# Patient Record
Sex: MALE | Race: WHITE | Employment: OTHER | URBAN - METROPOLITAN AREA
[De-identification: names, ages, dates, MRNs, and addresses within clinical notes are randomized per-mention and may not be internally consistent; named-entity substitution may affect disease eponyms.]

---

## 2017-10-05 RX ORDER — IBUPROFEN 400 MG/1
400 TABLET ORAL EVERY 6 HOURS PRN
COMMUNITY

## 2017-10-07 ENCOUNTER — HOSPITAL ENCOUNTER (OUTPATIENT)
Facility: HOSPITAL | Age: 57
Setting detail: HOSPITAL OUTPATIENT SURGERY
Discharge: HOME | End: 2017-10-07
Attending: ORTHOPAEDIC SURGERY | Admitting: ORTHOPAEDIC SURGERY
Payer: COMMERCIAL

## 2017-10-07 ENCOUNTER — SURGERY (OUTPATIENT)
Age: 57
End: 2017-10-07

## 2017-10-07 ENCOUNTER — ANESTHESIA EVENT (OUTPATIENT)
Dept: SURGERY | Facility: HOSPITAL | Age: 57
End: 2017-10-07
Payer: COMMERCIAL

## 2017-10-07 ENCOUNTER — ANESTHESIA (OUTPATIENT)
Dept: SURGERY | Facility: HOSPITAL | Age: 57
End: 2017-10-07
Payer: COMMERCIAL

## 2017-10-07 VITALS
SYSTOLIC BLOOD PRESSURE: 110 MMHG | HEART RATE: 61 BPM | RESPIRATION RATE: 16 BRPM | TEMPERATURE: 98 F | DIASTOLIC BLOOD PRESSURE: 78 MMHG | OXYGEN SATURATION: 98 % | WEIGHT: 206 LBS | BODY MASS INDEX: 31.22 KG/M2 | HEIGHT: 68 IN

## 2017-10-07 DIAGNOSIS — S52.509A DISTAL RADIAL FRACTURE: Primary | ICD-10-CM

## 2017-10-07 PROCEDURE — 64417 NJX AA&/STRD AX NERVE IMG: CPT | Performed by: ORTHOPAEDIC SURGERY

## 2017-10-07 PROCEDURE — 0PSJ04Z REPOSITION LEFT RADIUS WITH INTERNAL FIXATION DEVICE, OPEN APPROACH: ICD-10-PCS | Performed by: ORTHOPAEDIC SURGERY

## 2017-10-07 PROCEDURE — 76942 ECHO GUIDE FOR BIOPSY: CPT | Performed by: ORTHOPAEDIC SURGERY

## 2017-10-07 PROCEDURE — 01N50ZZ RELEASE MEDIAN NERVE, OPEN APPROACH: ICD-10-PCS | Performed by: ORTHOPAEDIC SURGERY

## 2017-10-07 PROCEDURE — 99152 MOD SED SAME PHYS/QHP 5/>YRS: CPT | Performed by: ORTHOPAEDIC SURGERY

## 2017-10-07 PROCEDURE — 3E0T3BZ INTRODUCTION OF ANESTHETIC AGENT INTO PERIPHERAL NERVES AND PLEXI, PERCUTANEOUS APPROACH: ICD-10-PCS | Performed by: ANESTHESIOLOGY

## 2017-10-07 DEVICE — SCREW BN 2.4MM 13MM LCP SS: Type: IMPLANTABLE DEVICE | Site: WRIST | Status: FUNCTIONAL

## 2017-10-07 DEVICE — SCREW BN 2.4MM 16MM LCP SS: Type: IMPLANTABLE DEVICE | Site: WRIST | Status: FUNCTIONAL

## 2017-10-07 DEVICE — PLATE NAR 51MM SS 2.4MM SCR 6: Type: IMPLANTABLE DEVICE | Site: WRIST | Status: FUNCTIONAL

## 2017-10-07 DEVICE — WIRE K SYNT 1.6 292.16: Type: IMPLANTABLE DEVICE | Site: WRIST | Status: FUNCTIONAL

## 2017-10-07 DEVICE — SCREW BN 2.4MM 20MM LCP SS: Type: IMPLANTABLE DEVICE | Site: WRIST | Status: FUNCTIONAL

## 2017-10-07 DEVICE — SCREW BN 2.4MM 4MM 14MM LCP SS: Type: IMPLANTABLE DEVICE | Site: WRIST | Status: FUNCTIONAL

## 2017-10-07 RX ORDER — ACETAMINOPHEN 500 MG
1000 TABLET ORAL EVERY 6 HOURS PRN
COMMUNITY

## 2017-10-07 RX ORDER — NALOXONE HYDROCHLORIDE 0.4 MG/ML
80 INJECTION, SOLUTION INTRAMUSCULAR; INTRAVENOUS; SUBCUTANEOUS AS NEEDED
Status: DISCONTINUED | OUTPATIENT
Start: 2017-10-07 | End: 2017-10-07

## 2017-10-07 RX ORDER — MORPHINE SULFATE 10 MG/ML
6 INJECTION, SOLUTION INTRAMUSCULAR; INTRAVENOUS EVERY 10 MIN PRN
Status: DISCONTINUED | OUTPATIENT
Start: 2017-10-07 | End: 2017-10-07

## 2017-10-07 RX ORDER — HYDROCODONE BITARTRATE AND ACETAMINOPHEN 5; 325 MG/1; MG/1
2 TABLET ORAL AS NEEDED
Status: DISCONTINUED | OUTPATIENT
Start: 2017-10-07 | End: 2017-10-07

## 2017-10-07 RX ORDER — HYDROCODONE BITARTRATE AND ACETAMINOPHEN 5; 325 MG/1; MG/1
1 TABLET ORAL AS NEEDED
Status: DISCONTINUED | OUTPATIENT
Start: 2017-10-07 | End: 2017-10-07

## 2017-10-07 RX ORDER — SODIUM CHLORIDE, SODIUM LACTATE, POTASSIUM CHLORIDE, CALCIUM CHLORIDE 600; 310; 30; 20 MG/100ML; MG/100ML; MG/100ML; MG/100ML
INJECTION, SOLUTION INTRAVENOUS CONTINUOUS
Status: DISCONTINUED | OUTPATIENT
Start: 2017-10-07 | End: 2017-10-07

## 2017-10-07 RX ORDER — HYDROMORPHONE HYDROCHLORIDE 1 MG/ML
0.2 INJECTION, SOLUTION INTRAMUSCULAR; INTRAVENOUS; SUBCUTANEOUS EVERY 5 MIN PRN
Status: DISCONTINUED | OUTPATIENT
Start: 2017-10-07 | End: 2017-10-07

## 2017-10-07 RX ORDER — BACITRACIN 50000 [USP'U]/1
INJECTION, POWDER, LYOPHILIZED, FOR SOLUTION INTRAMUSCULAR AS NEEDED
Status: DISCONTINUED | OUTPATIENT
Start: 2017-10-07 | End: 2017-10-07 | Stop reason: HOSPADM

## 2017-10-07 RX ORDER — HYDROMORPHONE HYDROCHLORIDE 1 MG/ML
0.4 INJECTION, SOLUTION INTRAMUSCULAR; INTRAVENOUS; SUBCUTANEOUS EVERY 5 MIN PRN
Status: DISCONTINUED | OUTPATIENT
Start: 2017-10-07 | End: 2017-10-07

## 2017-10-07 RX ORDER — MORPHINE SULFATE 2 MG/ML
2 INJECTION, SOLUTION INTRAMUSCULAR; INTRAVENOUS EVERY 10 MIN PRN
Status: DISCONTINUED | OUTPATIENT
Start: 2017-10-07 | End: 2017-10-07

## 2017-10-07 RX ORDER — ONDANSETRON 2 MG/ML
4 INJECTION INTRAMUSCULAR; INTRAVENOUS ONCE AS NEEDED
Status: DISCONTINUED | OUTPATIENT
Start: 2017-10-07 | End: 2017-10-07

## 2017-10-07 RX ORDER — FAMOTIDINE 20 MG/1
20 TABLET ORAL ONCE
Status: COMPLETED | OUTPATIENT
Start: 2017-10-07 | End: 2017-10-07

## 2017-10-07 RX ORDER — DEXAMETHASONE SODIUM PHOSPHATE 4 MG/ML
VIAL (ML) INJECTION AS NEEDED
Status: DISCONTINUED | OUTPATIENT
Start: 2017-10-07 | End: 2017-10-07 | Stop reason: SURG

## 2017-10-07 RX ORDER — ONDANSETRON 2 MG/ML
INJECTION INTRAMUSCULAR; INTRAVENOUS AS NEEDED
Status: DISCONTINUED | OUTPATIENT
Start: 2017-10-07 | End: 2017-10-07 | Stop reason: SURG

## 2017-10-07 RX ORDER — MORPHINE SULFATE 4 MG/ML
4 INJECTION, SOLUTION INTRAMUSCULAR; INTRAVENOUS EVERY 10 MIN PRN
Status: DISCONTINUED | OUTPATIENT
Start: 2017-10-07 | End: 2017-10-07

## 2017-10-07 RX ORDER — HYDROMORPHONE HYDROCHLORIDE 1 MG/ML
0.6 INJECTION, SOLUTION INTRAMUSCULAR; INTRAVENOUS; SUBCUTANEOUS EVERY 5 MIN PRN
Status: DISCONTINUED | OUTPATIENT
Start: 2017-10-07 | End: 2017-10-07

## 2017-10-07 RX ORDER — ROPIVACAINE HYDROCHLORIDE 5 MG/ML
INJECTION, SOLUTION EPIDURAL; INFILTRATION; PERINEURAL AS NEEDED
Status: DISCONTINUED | OUTPATIENT
Start: 2017-10-07 | End: 2017-10-07 | Stop reason: SURG

## 2017-10-07 RX ORDER — ACETAMINOPHEN 325 MG/1
650 TABLET ORAL ONCE
Status: COMPLETED | OUTPATIENT
Start: 2017-10-07 | End: 2017-10-07

## 2017-10-07 RX ORDER — METOCLOPRAMIDE 10 MG/1
10 TABLET ORAL ONCE
Status: COMPLETED | OUTPATIENT
Start: 2017-10-07 | End: 2017-10-07

## 2017-10-07 RX ORDER — LIDOCAINE HYDROCHLORIDE 10 MG/ML
INJECTION, SOLUTION EPIDURAL; INFILTRATION; INTRACAUDAL; PERINEURAL AS NEEDED
Status: DISCONTINUED | OUTPATIENT
Start: 2017-10-07 | End: 2017-10-07 | Stop reason: SURG

## 2017-10-07 RX ORDER — HALOPERIDOL 5 MG/ML
0.25 INJECTION INTRAMUSCULAR ONCE AS NEEDED
Status: DISCONTINUED | OUTPATIENT
Start: 2017-10-07 | End: 2017-10-07

## 2017-10-07 RX ORDER — MIDAZOLAM HYDROCHLORIDE 1 MG/ML
INJECTION INTRAMUSCULAR; INTRAVENOUS AS NEEDED
Status: DISCONTINUED | OUTPATIENT
Start: 2017-10-07 | End: 2017-10-07 | Stop reason: SURG

## 2017-10-07 RX ADMIN — DEXAMETHASONE SODIUM PHOSPHATE 4 MG: 4 MG/ML VIAL (ML) INJECTION at 08:07:00

## 2017-10-07 RX ADMIN — ROPIVACAINE HYDROCHLORIDE 30 ML: 5 INJECTION, SOLUTION EPIDURAL; INFILTRATION; PERINEURAL at 07:56:00

## 2017-10-07 RX ADMIN — SODIUM CHLORIDE, SODIUM LACTATE, POTASSIUM CHLORIDE, CALCIUM CHLORIDE: 600; 310; 30; 20 INJECTION, SOLUTION INTRAVENOUS at 08:05:00

## 2017-10-07 RX ADMIN — SODIUM CHLORIDE, SODIUM LACTATE, POTASSIUM CHLORIDE, CALCIUM CHLORIDE: 600; 310; 30; 20 INJECTION, SOLUTION INTRAVENOUS at 09:55:00

## 2017-10-07 RX ADMIN — MIDAZOLAM HYDROCHLORIDE 2 MG: 1 INJECTION INTRAMUSCULAR; INTRAVENOUS at 07:51:00

## 2017-10-07 RX ADMIN — ONDANSETRON 4 MG: 2 INJECTION INTRAMUSCULAR; INTRAVENOUS at 08:07:00

## 2017-10-07 RX ADMIN — LIDOCAINE HYDROCHLORIDE 50 MG: 10 INJECTION, SOLUTION EPIDURAL; INFILTRATION; INTRACAUDAL; PERINEURAL at 08:07:00

## 2017-10-07 RX ADMIN — LIDOCAINE HYDROCHLORIDE 2 ML: 10 INJECTION, SOLUTION EPIDURAL; INFILTRATION; INTRACAUDAL; PERINEURAL at 07:55:00

## 2017-10-07 NOTE — ANESTHESIA PROCEDURE NOTES
Peripheral Block    Anesthesiologist:  Karson Can  Performed by:   Anesthesiologist  Patient Location:  PACU  Start Time:  10/7/2017 7:51 AM  End Time:  10/7/2017 7:56 AM  Site Identification: ultrasound guided, real time ultrasound guided, nerve stimula

## 2017-10-07 NOTE — ANESTHESIA PREPROCEDURE EVALUATION
Anesthesia PreOp Note    HPI:     Sammy Chaudhary is a 56year old male who presents for preoperative consultation requested by: Yadira Fang MD    Date of Surgery: 10/7/2017    Procedure(s):  WRIST OPEN REDUCTION INTERNAL FIXATION  Indication: left wris saturation is 96%.     10/05/17  1802 10/07/17  0500   BP:  126/80   BP Location:  Right arm   Pulse:  67   Resp:  18   Temp:  97.8 °F (36.6 °C)   TempSrc:  Oral   SpO2:  96%   Weight: 90.7 kg (200 lb) 93.4 kg (206 lb)   Height: 1.727 m (5' 8\")         Ane

## 2017-10-07 NOTE — BRIEF OP NOTE
Pre-Operative Diagnosis: left wrist fracture     Post-Operative Diagnosis: left wrist fracture [id number] distal radial fracture with severe swelling and abrasions to palm      Procedure Performed:   Procedure(s):  left wrist open reduction internal

## 2017-10-07 NOTE — ANESTHESIA POSTPROCEDURE EVALUATION
Patient: Marco Antonio Wilks    Procedure Summary     Date:  10/07/17 Room / Location:  [facility] OR 05 / [facility] OR    Anesthesia Start:  0805 Anesthesia Stop:      Procedure:  WRIST OPEN REDUCTION INTERNAL FIXATION (Left Wrist) Diagnosis:  (left wrist fractu

## 2017-10-10 NOTE — OPERATIVE REPORT
[facility]    PATIENT'S NAME: Masood Bowen   ATTENDING PHYSICIAN: Greg Parada MD   OPERATING PHYSICIAN: Greg Parada MD   PATIENT ACCOUNT#:   [id number]    LOCATION:  [address]  MEDICAL RECORD #:   [id number]       DATE OF placed was removed. Abrasions were cleaned and debrided. Axillary high tourniquet was placed. He received IV antibiotics preoperatively. The right upper extremity was examined under fluoroscopic guidance throughout the operative procedure.   The patient fluoroscopic motion imaging studies. With this having been accomplished, a volar plate was placed. The metaphyseal segment reapplied.   The distal radial ulnar joint had been reduced with large volar articular fragments having been reduced and impacted in oblique including a lateral joint line view with slight distraction placed on the radial carpal joint. The 1.8 mm pin was cut short.   A longitudinal incision was then placed distally extending down through subcutaneous tissue under loupe magnification in excellent condition. There were no complications. Discussion was undertaken with his wife and family. He was placed in Ace. A sterile compression dressing with a sugar tong plaster splint. A Polar Care was utilized, as well as a Augie Marinelli

## (undated) DEVICE — SUTURE MONOCRYL 3-0 Y497G

## (undated) DEVICE — DRESSING SILVERLON 2X12

## (undated) DEVICE — TOWEL OR BLU 16X26 STRL

## (undated) DEVICE — DERMABOND LIQUID ADHESIVE

## (undated) DEVICE — UPPER EXTREMITY: Brand: MEDLINE INDUSTRIES, INC.

## (undated) DEVICE — 3M™ COBAN™ NL STERILE NON-LATEX SELF-ADHERENT WRAP, 2084S, 4 IN X 5 YD (10 CM X 4,5 M), 18 ROLLS/CASE: Brand: 3M™ COBAN™

## (undated) DEVICE — DRAPE MN CARM

## (undated) DEVICE — VIOLET BRAIDED (POLYGLACTIN 910), SYNTHETIC ABSORBABLE SUTURE: Brand: COATED VICRYL

## (undated) DEVICE — REM POLYHESIVE ADULT PATIENT RETURN ELECTRODE: Brand: VALLEYLAB

## (undated) DEVICE — OCCLUSIVE GAUZE STRIP OVERWRAP,3% BISMUTH TRIBROMOPHENATE IN PETROLATUM BLEND: Brand: XEROFORM

## (undated) DEVICE — SUCTION CANISTER, 3000CC,SAFELINER: Brand: DEROYAL

## (undated) DEVICE — NON-ADHERENT PAD PREPACK: Brand: TELFA

## (undated) DEVICE — GAUZE SPONGES,12 PLY: Brand: CURITY

## (undated) DEVICE — CHLORAPREP 26ML APPLICATOR

## (undated) DEVICE — COTTON UNDERCAST PADDING,REGULAR FINISH: Brand: WEBRIL

## (undated) DEVICE — STERILE LATEX POWDER-FREE SURGICAL GLOVESWITH NITRILE COATING: Brand: PROTEXIS

## (undated) DEVICE — ARM SLING: Brand: DEROYAL

## (undated) DEVICE — STERILE POLYISOPRENE POWDER-FREE SURGICAL GLOVES: Brand: PROTEXIS

## (undated) DEVICE — COVER SGL STRL LGHT HNDL BLU

## (undated) DEVICE — DRESSING SLVRLN 12X4IN ABS

## (undated) DEVICE — DRAPE SHEET LG

## (undated) DEVICE — STERILE TETRA-FLEX CF, ELASTIC BANDAGEE, 3" X 5.5YD: Brand: TETRA-FLEX™CF

## (undated) DEVICE — DRAPE SHEET LAVH 124X112X30

## (undated) DEVICE — SUTURE MONOCRYL 3-0 Y936H

## (undated) DEVICE — BIT DRL 110MM 1.8MM SS QC D

## (undated) DEVICE — ZIMMER® STERILE DISPOSABLE TOURNIQUET CUFF WITH PLC, DUAL PORT, SINGLE BLADDER, 24 IN. (61 CM)

## (undated) DEVICE — SOL  .9 1000ML BTL

## (undated) DEVICE — PADDING CAST COTTON STER 3

## (undated) DEVICE — PAD THRP 16IN WRPON MU LNG STM

## (undated) DEVICE — ABDOMINAL PAD: Brand: CURITY

## (undated) DEVICE — SPLINT PLASTER 5

## (undated) DEVICE — GOWN SURG AERO BLUE PERF XLG

## (undated) DEVICE — SUTURE VICRYL 2-0 FS-1

## (undated) DEVICE — STERILE TETRA-FLEX CF, ELASTIC BANDAGE, 4" X 5.5YD: Brand: TETRA-FLEX™CF